# Patient Record
Sex: FEMALE | Race: OTHER | HISPANIC OR LATINO | URBAN - METROPOLITAN AREA
[De-identification: names, ages, dates, MRNs, and addresses within clinical notes are randomized per-mention and may not be internally consistent; named-entity substitution may affect disease eponyms.]

---

## 2021-01-11 ENCOUNTER — EMERGENCY (EMERGENCY)
Facility: HOSPITAL | Age: 26
LOS: 1 days | Discharge: HOME | End: 2021-01-11
Attending: EMERGENCY MEDICINE
Payer: SELF-PAY

## 2021-01-11 VITALS
TEMPERATURE: 98 F | SYSTOLIC BLOOD PRESSURE: 112 MMHG | OXYGEN SATURATION: 99 % | DIASTOLIC BLOOD PRESSURE: 74 MMHG | RESPIRATION RATE: 16 BRPM | HEART RATE: 89 BPM

## 2021-01-11 PROCEDURE — 71046 X-RAY EXAM CHEST 2 VIEWS: CPT

## 2021-01-11 PROCEDURE — 93005 ELECTROCARDIOGRAM TRACING: CPT

## 2021-01-11 PROCEDURE — 93010 ELECTROCARDIOGRAM REPORT: CPT

## 2021-01-11 PROCEDURE — 99283 EMERGENCY DEPT VISIT LOW MDM: CPT | Mod: 25

## 2021-01-11 PROCEDURE — 99284 EMERGENCY DEPT VISIT MOD MDM: CPT

## 2021-01-11 PROCEDURE — 99053 MED SERV 10PM-8AM 24 HR FAC: CPT

## 2021-01-11 PROCEDURE — 71046 X-RAY EXAM CHEST 2 VIEWS: CPT | Mod: 26

## 2021-01-11 NOTE — ED PROVIDER NOTE - PATIENT PORTAL LINK FT
You can access the FollowMyHealth Patient Portal offered by [facility] by registering at the following website: [email or web address]. By joining [facility]’s FollowMyHealth portal, you will also be able to view your health information using other applications (apps) compatible with our system.

## 2021-01-11 NOTE — ED PROVIDER NOTE - ATTENDING CONTRIBUTION TO CARE
I, Guillaume Carias, performed a face to face bedside interview with this patient regarding history of present illness, review of symptoms and relevant past medical, social and family history.  I completed an independent physical examination. I have communicated the patient’s plan of care and disposition with the ACP.      26 yo female with dizziness, sob, and heart  racing; pe awake alert in nad heent ncat neck supple cor s1 s2 lungs clear abd soft nontender neuro nonfocal

## 2021-01-11 NOTE — ED PROVIDER NOTE - CLINICAL SUMMARY MEDICAL DECISION MAKING FREE TEXT BOX
26 yo female no significant past medical hx presenting to the ER with episode of dizziness, sob, and anxiousness while at work. nontoxic appearing nad no si/hi. ekg. xray and re-eval

## 2021-01-11 NOTE — ED PROVIDER NOTE - NSFOLLOWUPINSTRUCTIONS_ED_ALL_ED_FT
Anxiety    Generalized anxiety disorder (LEEANNA) is a mental disorder. It is defined as anxiety that is not necessarily related to specific events or activities or is out of proportion to said events. Symptoms include restlessness, fatigue, difficulty concentrations, irritability and difficulty concentrating. It may interfere with life functions, including relationships, work, and school. If you were started on a medication, make sure to take exactly as prescribed and follow up with a psychiatrist.    SEEK IMMEDIATE MEDICAL CARE IF YOU HAVE ANY OF THE FOLLOWING SYMPTOMS: thoughts about hurting killing yourself, thoughts about hurting or killing somebody else, hallucinations, or worsening depression.    Ansiedad    El trastorno de ansiedad generalizada (LEEANNA, por luis que se debe hacer) es un trastorno mental. Se define sarah ansiedad que no está necesariamente relacionada con eventos o actividades específicas o es desproporcionada a dichos eventos. Los síntomas incluyen inquietud, fatiga, concentraciones de dificultad, irritabilidad y dificultad para concentrarse. Puede interferir con las funciones de la radha, incluidas las relaciones, el trabajo y la escuela. Si se le inició un medicamento, asegúrese de harriet exactamente sarah se le recetó y hacer un seguimiento con un psiquiatra.    BUSCA CUIDADO MEDICAL INMEDIATO SI TIENES CUALQUIERA DE LOS SIGUIENTES SYMPTOMS: pensamientos sobre lastimarte a matarte, pensamientos sobre herir o matar a otra persona, alucinaciones o empeoramiento de la depresión.

## 2021-01-11 NOTE — ED PROVIDER NOTE - OBJECTIVE STATEMENT
24 yo female states that she was at work, states that all of a sudden she felt dizzy and her heart racing, headache and mild sob  while she was at work.  no pain in the chest no blurry vision. states that symptoms started with dizziness. states that symptoms lasted for 10 mins goes away comes back. states that she anxious currently. no hx of panic attacks. no reported hardy issues. no hx of covid. non smoker no illicit drug use. no known familiar cardiac issues.  perc negative

## 2021-01-11 NOTE — ED ADULT TRIAGE NOTE - CHIEF COMPLAINT QUOTE
pt feels like her heart is racing for the past 20 minutes. pt denies chest pain or shortness of breath. felt dizziness headache like having a panic attack while at work. pt appears to have relief now.

## 2021-01-11 NOTE — ED PROVIDER NOTE - PROGRESS NOTE DETAILS
dc with referral to the [facility] clinic. advised on strict return precautions. verbalizes understanding

## 2021-07-30 ENCOUNTER — EMERGENCY (EMERGENCY)
Facility: HOSPITAL | Age: 26
LOS: 1 days | Discharge: HOME | End: 2021-07-30
Attending: EMERGENCY MEDICINE
Payer: MEDICAID

## 2021-07-30 VITALS
TEMPERATURE: 98 F | WEIGHT: 119.93 LBS | HEIGHT: 62 IN | RESPIRATION RATE: 18 BRPM | SYSTOLIC BLOOD PRESSURE: 110 MMHG | HEART RATE: 78 BPM | OXYGEN SATURATION: 99 % | DIASTOLIC BLOOD PRESSURE: 72 MMHG

## 2021-07-30 PROCEDURE — 73610 X-RAY EXAM OF ANKLE: CPT | Mod: 26,LT

## 2021-07-30 PROCEDURE — 99283 EMERGENCY DEPT VISIT LOW MDM: CPT | Mod: 25

## 2021-07-30 PROCEDURE — 73610 X-RAY EXAM OF ANKLE: CPT

## 2021-07-30 PROCEDURE — 99283 EMERGENCY DEPT VISIT LOW MDM: CPT

## 2021-07-30 RX ORDER — IBUPROFEN 200 MG
600 TABLET ORAL ONCE
Refills: 0 | Status: DISCONTINUED | OUTPATIENT
Start: 2021-07-30 | End: 2021-08-03

## 2021-07-30 NOTE — ED PROVIDER NOTE - MUSCULOSKELETAL MINIMAL EXAM
Mild edema of left ankle. Tender to palpation around left lateral malleolus/normal range of motion/motor intact/TENDERNESS

## 2021-07-30 NOTE — ED PROVIDER NOTE - CLINICAL SUMMARY MEDICAL DECISION MAKING FREE TEXT BOX
left ankle inversion injury xray without acute fracture. mild soft tissue swelling around lateral mal bearing weight on foot.  air cast and fu with pmd

## 2021-07-30 NOTE — ED PROVIDER NOTE - NSFOLLOWUPCLINICS_GEN_ALL_ED_FT
[facility]  Orthopedics  [address] [place] [id number]  Phone: [phone]  Fax:   Follow Up Time: 7-10 Days

## 2021-07-30 NOTE — ED PROVIDER NOTE - OBJECTIVE STATEMENT
Pt is a 26yo female with no significant PMH complaining of left ankle pain. The patient twisted her ankle while walking earlier today and said she heard a crack. Currently she has throbbing pain 10/10 localized to her lateral malleolus. The patient has not taken anything for the pain. Pt denies pain anywhere else, fever, chills, falls, SOB, chest pain.

## 2021-07-30 NOTE — ED PROVIDER NOTE - PROGRESS NOTE DETAILS
KAELYN HIGGINS no acute fracture noted on xray placed in ace and air cast advised on conservative management and fu with pmd

## 2021-07-30 NOTE — ED PROVIDER NOTE - ATTENDING CONTRIBUTION TO CARE
twisted left ankle earlier today.  reports pain with weight bearing.  Denies knee or hip pain.  PE: no obvious deformities,  TTP over ATFL, no obvious swelling.  Xray: no fracture.  A/P ankle pain, likely ankle sprain:  anticipatory guidance provided and supportive care recommended.

## 2024-03-27 PROBLEM — Z00.00 ENCOUNTER FOR PREVENTIVE HEALTH EXAMINATION: Status: ACTIVE | Noted: 2024-03-27

## 2024-05-20 ENCOUNTER — APPOINTMENT (OUTPATIENT)
Dept: SURGERY | Facility: CLINIC | Age: 29
End: 2024-05-20

## 2024-06-24 ENCOUNTER — OFFICE (OUTPATIENT)
Dept: URBAN - METROPOLITAN AREA CLINIC 116 | Facility: CLINIC | Age: 29
Setting detail: OPHTHALMOLOGY
End: 2024-06-24
Payer: COMMERCIAL

## 2024-06-24 DIAGNOSIS — Z01.00: ICD-10-CM

## 2024-06-24 PROBLEM — H52.223 ASTIGMATISM, REGULAR; BOTH EYES: Status: ACTIVE | Noted: 2024-06-24

## 2024-06-24 PROCEDURE — 92004 COMPRE OPH EXAM NEW PT 1/>: CPT | Performed by: OPTOMETRIST

## 2024-06-24 ASSESSMENT — CONFRONTATIONAL VISUAL FIELD TEST (CVF)
OD_FINDINGS: FULL
OS_FINDINGS: FULL